# Patient Record
Sex: FEMALE | Race: WHITE | URBAN - METROPOLITAN AREA
[De-identification: names, ages, dates, MRNs, and addresses within clinical notes are randomized per-mention and may not be internally consistent; named-entity substitution may affect disease eponyms.]

---

## 2017-02-28 ENCOUNTER — OFFICE VISIT (OUTPATIENT)
Dept: FAMILY MEDICINE | Facility: CLINIC | Age: 32
End: 2017-02-28
Payer: COMMERCIAL

## 2017-02-28 ENCOUNTER — TRANSFERRED RECORDS (OUTPATIENT)
Dept: HEALTH INFORMATION MANAGEMENT | Facility: CLINIC | Age: 32
End: 2017-02-28

## 2017-02-28 VITALS
BODY MASS INDEX: 29.43 KG/M2 | OXYGEN SATURATION: 99 % | HEART RATE: 65 BPM | WEIGHT: 172.4 LBS | SYSTOLIC BLOOD PRESSURE: 124 MMHG | TEMPERATURE: 97.8 F | HEIGHT: 64 IN | RESPIRATION RATE: 18 BRPM | DIASTOLIC BLOOD PRESSURE: 80 MMHG

## 2017-02-28 DIAGNOSIS — R10.9 FLANK PAIN: Primary | ICD-10-CM

## 2017-02-28 DIAGNOSIS — R19.00 PELVIC MASS: ICD-10-CM

## 2017-02-28 LAB
ALBUMIN UR-MCNC: NEGATIVE MG/DL
AMORPH CRY #/AREA URNS HPF: ABNORMAL /HPF
APPEARANCE UR: NORMAL
BACTERIA #/AREA URNS HPF: ABNORMAL /HPF
BETA HCG QUAL IFA URINE: NEGATIVE
BILIRUB UR QL STRIP: NEGATIVE
COLOR UR AUTO: YELLOW
GLUCOSE UR STRIP-MCNC: NEGATIVE MG/DL
HGB UR QL STRIP: NEGATIVE
KETONES UR STRIP-MCNC: NEGATIVE MG/DL
LEUKOCYTE ESTERASE UR QL STRIP: NEGATIVE
NITRATE UR QL: NEGATIVE
NON-SQ EPI CELLS #/AREA URNS LPF: ABNORMAL /LPF
PH UR STRIP: 6 PH (ref 5–7)
RBC #/AREA URNS AUTO: ABNORMAL /HPF (ref 0–2)
SP GR UR STRIP: 1.01 (ref 1–1.03)
URN SPEC COLLECT METH UR: NORMAL
UROBILINOGEN UR STRIP-ACNC: 0.2 EU/DL (ref 0.2–1)
WBC #/AREA URNS AUTO: ABNORMAL /HPF (ref 0–2)

## 2017-02-28 PROCEDURE — 81001 URINALYSIS AUTO W/SCOPE: CPT | Performed by: PHYSICIAN ASSISTANT

## 2017-02-28 PROCEDURE — 84703 CHORIONIC GONADOTROPIN ASSAY: CPT | Performed by: PHYSICIAN ASSISTANT

## 2017-02-28 PROCEDURE — 99214 OFFICE O/P EST MOD 30 MIN: CPT | Performed by: PHYSICIAN ASSISTANT

## 2017-02-28 RX ORDER — ONDANSETRON 4 MG/1
4 TABLET, FILM COATED ORAL ONCE
Qty: 1 TABLET | Refills: 0
Start: 2017-02-28 | End: 2017-02-28

## 2017-02-28 RX ORDER — HYDROCODONE BITARTRATE AND ACETAMINOPHEN 5; 325 MG/1; MG/1
1 TABLET ORAL EVERY 6 HOURS PRN
Qty: 12 TABLET | Refills: 0 | Status: SHIPPED | OUTPATIENT
Start: 2017-02-28

## 2017-02-28 RX ORDER — ONDANSETRON 4 MG/1
4 TABLET, FILM COATED ORAL EVERY 8 HOURS PRN
Qty: 15 TABLET | Refills: 0 | Status: SHIPPED | OUTPATIENT
Start: 2017-02-28

## 2017-02-28 RX ORDER — KETOROLAC TROMETHAMINE 30 MG/ML
60 INJECTION, SOLUTION INTRAMUSCULAR; INTRAVENOUS ONCE
Qty: 2 ML | Refills: 0 | OUTPATIENT
Start: 2017-02-28 | End: 2017-02-28

## 2017-02-28 ASSESSMENT — PAIN SCALES - GENERAL: PAINLEVEL: EXTREME PAIN (8)

## 2017-02-28 NOTE — MR AVS SNAPSHOT
After Visit Summary   2/28/2017    Zenaida Yang    MRN: [id number]           Patient Information     Date Of Birth          1985        Visit Information        Provider Department      2/28/2017 3:20 PM Elliott Randolph PA [facility]        Today's Diagnoses     Flank pain    -  1      Care Instructions      Flank Pain, Uncertain Cause  The flank is the area between your upper abdomen and your back. Pain there is often caused by a problem with your kidneys. It might be a kidney infection or a kidney stone. Other causes of flank pain include spinal arthritis, a pinched nerve from a back injury, or a back muscle strain or spasm.  The cause of your flank pain is not certain. You may need other tests.  Home care  Follow these tips when caring for yourself at home:    You may use acetaminophen or ibuprofen to control pain, unless your health care provider prescribed another medicine Note: If you have chronic liver or kidney disease, talk with your provider before taking these medicines. Also talk with your provider first if you ve had a stomach ulcer or GI bleeding.    If the cause of your pain is coming from the muscles, you may get relief with ice or heat: Ice: During the first 2 days after the injury, put an ice pack on the painful area for 20 minutes every 2 to 4 hours. This will reduce swelling and pain. A hot shower, hot bath, or heating pad works well for muscle spasm. You can start with ice, then switch to heat after 2 days. You might find that alternating ice and heat works well. Use the method that feels the best to you.  Follow-up care  Follow up with your health care provider. if your symptoms don t get better over the next few days.  When to seek medical advice  Call your health care provider right away if any of these happen:    Repeated vomiting    Fever of 100.4 F (38 C) or higher, or as directed by your health care provider    Flank pain that gets  worse    Pain that spreads to the front of your belly (abdomen)    Dizziness, weakness, or fainting    Blood in your urine    Burning feeling when you urinate or the need to urinate often    Pain in one of your legs that gets worse    Numbness or weakness in a leg    7523-5027 [employer]. [address] [place] [address]. All rights reserved. This information is not intended as a substitute for professional medical care. Always follow your healthcare professional's instructions.              Follow-ups after your visit        Follow-up notes from your care team     Return if symptoms worsen or fail to improve.      Future tests that were ordered for you today     Open Future Orders        Priority Expected Expires Ordered    CT Abdomen Pelvis w/o Contrast Routine  2/28/2018 2/28/2017            Who to contact     If you have questions or need follow up information about today's clinic visit or your schedule please contact [facility] directly at [phone].  Normal or non-critical lab and imaging results will be communicated to you by [employer], letter or phone within 4 business days after the clinic has received the results. If you do not hear from us within 7 days, please contact the clinic through [facility] or phone. If you have a critical or abnormal lab result, we will notify you by phone as soon as possible.  Submit refill requests through [email or web address] or call your pharmacy and they will forward the refill request to us. Please allow 3 business days for your refill to be completed.          Additional Information About Your Visit        [employer] Information     [email or web address] gives you secure access to your electronic health record. If you see a primary care provider, you can also send messages to your care team and make appointments. If you have questions, please call your primary care clinic.  If you do not have a primary care provider, please call [phone] and they will  "assist you.        Care EveryWhere ID     This is your Care EveryWhere ID. This could be used by other organizations to access your [facility] medical records  [id number]        Your Vitals Were     Pulse Temperature Respirations Height Last Period Pulse Oximetry    65 97.8  F (36.6  C) (Oral) 18 5' 4\" (1.626 m) 01/28/2017 (Approximate) 99%    BMI (Body Mass Index)                   29.59 kg/m2            Blood Pressure from Last 3 Encounters:   02/28/17 124/80   11/19/13 112/74   09/28/10 114/73    Weight from Last 3 Encounters:   02/28/17 172 lb 6.4 oz (78.2 kg)   11/19/13 179 lb (81.2 kg)   09/28/10 153 lb (69.4 kg)              We Performed the Following     Beta HCG qual IFA urine     UA reflex to Microscopic and Culture     Urine Microscopic          Today's Medication Changes          These changes are accurate as of: 2/28/17  4:06 PM.  If you have any questions, ask your nurse or doctor.               Start taking these medicines.        Dose/Directions    HYDROcodone-acetaminophen 5-325 MG per tablet   Commonly known as:  NORCO   Used for:  Flank pain   Started by:  Elliott Randolph PA        Dose:  1 tablet   Take 1 tablet by mouth every 6 hours as needed for moderate to severe pain   Quantity:  12 tablet   Refills:  0       ketorolac 60 MG/2ML Soln injection   Commonly known as:  TORADOL   Used for:  Flank pain   Started by:  Elliott Randolph PA        Dose:  60 mg   Inject 2 mLs (60 mg) into the muscle once for 1 dose In clinic IM INJ   Quantity:  2 mL   Refills:  0       ondansetron 4 MG tablet   Commonly known as:  ZOFRAN   Used for:  Flank pain   Started by:  Elliott Randolph PA        Dose:  4 mg   Take 1 tablet (4 mg) by mouth every 8 hours as needed for nausea   Quantity:  15 tablet   Refills:  0            Where to get your medicines      These medications were sent to [place] [address] [place] [facility] - [address]  [address] [place] [address]     Phone: "  [phone]     ondansetron 4 MG tablet         Some of these will need a paper prescription and others can be bought over the counter.  Ask your nurse if you have questions.     Bring a paper prescription for each of these medications     HYDROcodone-acetaminophen 5-325 MG per tablet       You don't need a prescription for these medications     ketorolac 60 MG/2ML Soln injection                Primary Care Provider    Doctor Unknown, MD       No address on file        Thank you!     Thank you for choosing [facility]  for your care. Our goal is always to provide you with excellent care. Hearing back from our patients is one way we can continue to improve our services. Please take a few minutes to complete the written survey that you may receive in the mail after your visit with us. Thank you!             Your Updated Medication List - Protect others around you: Learn how to safely use, store and throw away your medicines at www.disposemymeds.org.          This list is accurate as of: 2/28/17  4:06 PM.  Always use your most recent med list.                   Brand Name Dispense Instructions for use    clotrimazole 1 % cream    LOTRIMIN    30 g    Apply 3-4 x daily to affected areas       HYDROcodone-acetaminophen 5-325 MG per tablet    NORCO    12 tablet    Take 1 tablet by mouth every 6 hours as needed for moderate to severe pain       ketorolac 60 MG/2ML Soln injection    TORADOL    2 mL    Inject 2 mLs (60 mg) into the muscle once for 1 dose In clinic IM INJ       ondansetron 4 MG tablet    ZOFRAN    15 tablet    Take 1 tablet (4 mg) by mouth every 8 hours as needed for nausea       PRENATAL PO      daily       ZOLOFT 50 MG tablet   Generic drug:  sertraline      Take 150 mg by mouth daily

## 2017-02-28 NOTE — PATIENT INSTRUCTIONS
Flank Pain, Uncertain Cause  The flank is the area between your upper abdomen and your back. Pain there is often caused by a problem with your kidneys. It might be a kidney infection or a kidney stone. Other causes of flank pain include spinal arthritis, a pinched nerve from a back injury, or a back muscle strain or spasm.  The cause of your flank pain is not certain. You may need other tests.  Home care  Follow these tips when caring for yourself at home:    You may use acetaminophen or ibuprofen to control pain, unless your health care provider prescribed another medicine Note: If you have chronic liver or kidney disease, talk with your provider before taking these medicines. Also talk with your provider first if you ve had a stomach ulcer or GI bleeding.    If the cause of your pain is coming from the muscles, you may get relief with ice or heat: Ice: During the first 2 days after the injury, put an ice pack on the painful area for 20 minutes every 2 to 4 hours. This will reduce swelling and pain. A hot shower, hot bath, or heating pad works well for muscle spasm. You can start with ice, then switch to heat after 2 days. You might find that alternating ice and heat works well. Use the method that feels the best to you.  Follow-up care  Follow up with your health care provider. if your symptoms don t get better over the next few days.  When to seek medical advice  Call your health care provider right away if any of these happen:    Repeated vomiting    Fever of 100.4 F (38 C) or higher, or as directed by your health care provider    Flank pain that gets worse    Pain that spreads to the front of your belly (abdomen)    Dizziness, weakness, or fainting    Blood in your urine    Burning feeling when you urinate or the need to urinate often    Pain in one of your legs that gets worse    Numbness or weakness in a leg    8646-9772 [employer]. [address] [place] [address], [place] [address]. All rights  reserved. This information is not intended as a substitute for professional medical care. Always follow your healthcare professional's instructions.

## 2017-02-28 NOTE — NURSING NOTE
The following medication was given:     MEDICATION: Ketorolac Tromethamine 60MG/2ML (30 mg/mL) (Toradol)  ROUTE: IM  SITE: LUQ - Gluteus  DOSE: 2ml   LOT #: [id number]  :  Ankita Andrews   EXPIRATION DATE:  06/01/2018   NDC#: 59535-015-69

## 2017-02-28 NOTE — NURSING NOTE
"RN Triage:     Chief Complaint   Patient presents with     Back Pain     Patient thought she pulled a muscle over the weekend in her low back. She had been icing it and taking IBUprofen and she thought it was getting better. Patient was in class around 2:30pm when all of a sudden she started getting waves of sharp pain in her low back and wrapping around RLQ. Patient rates it 8/10. She is nauseated. Denies CP, SOB, headache, dizziness, numbness/tingling, urinary symptoms, and fever. Patient has IUD. Last menstural cycle end of January, LBM this am, normal. Hx: ovarian cysts.       Initial BP (!) 151/93 (BP Location: Left arm, Patient Position: Chair, Cuff Size: Adult Large)  Pulse 65  Temp 97.8  F (36.6  C) (Oral)  Resp 18  Ht 5' 4\" (1.626 m)  Wt 172 lb 6.4 oz (78.2 kg)  LMP 01/28/2017 (Approximate)  SpO2 99%  BMI 29.59 kg/m2 Estimated body mass index is 29.59 kg/(m^2) as calculated from the following:    Height as of this encounter: 5' 4\" (1.626 m).    Weight as of this encounter: 172 lb 6.4 oz (78.2 kg).  BP completed using cuff size: X-large    Assessment:  Patient appears uncomfortable. Tearful.     Triage Dispo: Huddle with Provider to determine plan: roomed patient     Intervention: roomed patient; huddle with provider    Allie Manning RN    "

## 2017-02-28 NOTE — PROGRESS NOTES
HPI:  Back Pain        Patient thought she pulled a muscle over the weekend in her low back. She had been icing it and taking IBUprofen and she thought it was getting better. Patient was in class around 2:30pm when all of a sudden she started getting waves of sharp pain in her low back and wrapping around RLQ. Patient rates it 8/10. She is nauseated. Denies CP, SOB, headache, dizziness, numbness/tingling, urinary symptoms, and fever. Patient has IUD. Last menstural cycle end of January, LBM this am, normal. Hx: incidental ovarian cysts- never had pain with one.     Normal vaginal discharge           Allergies   Allergen Reactions     Seasonal Allergies      Take over-the-counter anti-histamine.        Past Medical History   Diagnosis Date     Depression, anxiety          Current Outpatient Prescriptions on File Prior to Visit:  sertraline (ZOLOFT) 50 MG tablet Take 150 mg by mouth daily    PRENATAL OR daily   clotrimazole (LOTRIMIN) 1 % cream Apply 3-4 x daily to affected areas (Patient not taking: Reported on 2/28/2017)     No current facility-administered medications on file prior to visit.     No past surgical history on file.    Social History     Social History     Marital status:      Spouse name: N/A     Number of children: N/A     Years of education: N/A     Occupational History     Not on file.     Social History Main Topics     Smoking status: Former Smoker     Quit date: 1/19/2010     Smokeless tobacco: Never Used     Alcohol use No     Drug use: No     Sexual activity: Yes     Partners: Male     Other Topics Concern     Parent/Sibling W/ Cabg, Mi Or Angioplasty Before 65f 55m? No     Social History Narrative       REVIEW OF SYSTEMS  General: negative for fever  : negative for dysuria , incontinence, frequency  Musculoskeletal: as above  Neurologic: negative for ataxia, saddle anesthesia, fecal incontinence, one sided weakness,  paresthesias    Physical Exam:  Vitals: /80  Pulse 65  Temp  "97.8  F (36.6  C) (Oral)  Resp 18  Ht 5' 4\" (1.626 m)  Wt 172 lb 6.4 oz (78.2 kg)  LMP 01/28/2017 (Approximate)  SpO2 99%  BMI 29.59 kg/m2  BMI= Body mass index is 29.59 kg/(m^2).  Constitutional: healthy, alert and no acute distress   CARDIO: RRR, no MRG  RESP: lungs CTA, NAD  NEURO: Patellar reflexes intact and equal b/l  BACK:  Straight leg raise intact, no paraspinal muscle TTP, strength intact and equal b/l lower extremities, NOCVAT  ABD; sof,t nonttp, 1+ bowel sounds  GAIT: intact  Psychiatric: mentation appears normal and affect normal/bright      Impression: Seems most c/w kidney stone    ICD-10-CM    1. Flank pain R10.9 UA reflex to Microscopic and Culture     Beta HCG qual IFA urine     Urine Microscopic     ketorolac (TORADOL) 60 MG/2ML SOLN injection     CT Abdomen Pelvis w/o Contrast     DISCONTINUED: ondansetron (ZOFRAN) 4 MG tablet     CANCELED: Neisseria gonorrhoeae PCR     CANCELED: Chlamydia trachomatis PCR     CANCELED: Wet prep       Plan:  Instructions for back care and return precautions discussed.        Jose Randolph PA-C      "

## 2017-02-28 NOTE — PROGRESS NOTES
.  Unfortunately patient's CT positive for large suspicious pelvic mass, recommend MRI pelvis with contrast.  Patient informed and provided number for [facility] imaging to schedule.  WIll refer to , GYN/ONC and /or or ONC depending on results.    Jose Randolph PA-C

## 2017-03-01 ENCOUNTER — TELEPHONE (OUTPATIENT)
Dept: FAMILY MEDICINE | Facility: CLINIC | Age: 32
End: 2017-03-01

## 2017-03-01 DIAGNOSIS — R19.00 PELVIC MASS: Primary | ICD-10-CM

## 2017-03-01 NOTE — TELEPHONE ENCOUNTER
"CT impression (for as needed use pending abstraction:  \"7.6 x 9.5x 10.2 cm midline pelvic mass with subtle internal calcifications superiorly to the right of midline.  This mass, which is superior to the urinary bladder, abuts the uterus and probably ovaries.  DDX includes a GYN mass of ovarian or uterine origin, versus teratoma, or less likely other abd neoplastic lesion.  Recommend contrast enhanced MRI of pelvis for further eval. \"    Jose Randolph PA-C    "

## 2017-03-01 NOTE — TELEPHONE ENCOUNTER
I spoke with Rae at [facility] oncology.  She advised getting  and CEA and CT CAP w and without contrast; which have been ordered.  Please inform patient of this and to come in for the labs.  She will run case by RN to see if MRI should still be done as well. She did note than in schedule, patient has MRI scheduled and a CT ABd/pelvis with contrast afterwards. RN please contact [facility] to ensure they see that, as of now anyway, that CT should include the chest as well.   She advised patient contact her directly  to schedule a GYN onc appt ( and will attempt to contact patient to sched as well). I LM with patient for call back in regards to above.      Jose Randolph PA-C

## 2017-03-08 NOTE — TELEPHONE ENCOUNTER
I spoke with pt- she ended up in Emergency Department and they removed the mass which was determined to be a benign dermoid. She is doing very well now.  Jose Randolph PA-C

## 2017-04-14 ENCOUNTER — TELEPHONE (OUTPATIENT)
Dept: FAMILY MEDICINE | Facility: CLINIC | Age: 32
End: 2017-04-14

## 2017-04-14 NOTE — LETTER
[facility]       [address]  [facility] [address]      April 14, 2017      Zenaida Yang  [address] [address]    Dear Zenaida Yang, [phone]    At [facility] we care about your health and are committed to providing quality patient care, which includes staying current on preventative cancer screenings.  You can increase your chances of finding and treating cancers through regular screenings.      Our records show that you are due for the following screening(s):    Pap Smear for cervical cancer  Recommended every three years for women 21 and older  A Pap test is used to detect cervical cancer.  The test should be taken at least once every three years but women who are at a greater risk for cervical cancer may need to have the test more often.      You are at a greater risk for cervical cancer if:   - You have had a sexually transmitted disease   - You have had more than one sex partner   - You have had an abnormal pap test in the past    You may contact the [facility] at [phone] to schedule the screening test(s) at your earliest convenience.    If you have a My-Chart Account, you also can schedule this appointment through there.    If you have already had one or all of the above screening tests at another facility, please call us so that we may update your chart.      Your partners in health,      Quality Committee   [facility]

## 2017-04-14 NOTE — TELEPHONE ENCOUNTER
Panel Management Review      BP Readings from Last 1 Encounters:   02/28/17 124/80        Fail List measure: pap      Patient is due/failing the following:   PAP    Action needed:   Patient needs office visit for physical.    Type of outreach:    Sent letter.    Questions for provider review:    None                                                                                                                                    Kajal Gan MA

## 2017-10-22 ENCOUNTER — HEALTH MAINTENANCE LETTER (OUTPATIENT)
Age: 32
End: 2017-10-22

## 2017-12-19 ENCOUNTER — TELEPHONE (OUTPATIENT)
Dept: FAMILY MEDICINE | Facility: CLINIC | Age: 32
End: 2017-12-19

## 2017-12-19 NOTE — TELEPHONE ENCOUNTER
Panel Management Review        Patient is due/failing the following:   Pap smear     Action needed:   none    Type of outreach:    per care everywhere pt had pap don pato 8/11/2015   Pap smear info was sent to be abstracted.    Questions for provider review:    None                                                                                   Araceli, CMA

## 2018-10-15 ENCOUNTER — HEALTH MAINTENANCE LETTER (OUTPATIENT)
Age: 33
End: 2018-10-15

## 2020-03-01 ENCOUNTER — HEALTH MAINTENANCE LETTER (OUTPATIENT)
Age: 35
End: 2020-03-01

## 2020-12-13 ENCOUNTER — HEALTH MAINTENANCE LETTER (OUTPATIENT)
Age: 35
End: 2020-12-13

## 2021-04-17 ENCOUNTER — HEALTH MAINTENANCE LETTER (OUTPATIENT)
Age: 36
End: 2021-04-17

## 2021-10-02 ENCOUNTER — HEALTH MAINTENANCE LETTER (OUTPATIENT)
Age: 36
End: 2021-10-02

## 2022-05-08 ENCOUNTER — HEALTH MAINTENANCE LETTER (OUTPATIENT)
Age: 37
End: 2022-05-08

## 2023-01-14 ENCOUNTER — HEALTH MAINTENANCE LETTER (OUTPATIENT)
Age: 38
End: 2023-01-14

## 2023-06-02 ENCOUNTER — HEALTH MAINTENANCE LETTER (OUTPATIENT)
Age: 38
End: 2023-06-02